# Patient Record
Sex: FEMALE | Race: WHITE | Employment: OTHER
[De-identification: names, ages, dates, MRNs, and addresses within clinical notes are randomized per-mention and may not be internally consistent; named-entity substitution may affect disease eponyms.]

---

## 2017-02-21 DIAGNOSIS — L65.2 ALOPECIA MUCINOSA: Primary | ICD-10-CM

## 2017-02-21 LAB
DEPRECATED CALCIDIOL+CALCIFEROL SERPL-MC: 37 UG/L (ref 20–75)
ERYTHROCYTE [DISTWIDTH] IN BLOOD BY AUTOMATED COUNT: 12.8 % (ref 10–15)
FERRITIN SERPL-MCNC: 24 NG/ML (ref 8–252)
HCT VFR BLD AUTO: 38.3 % (ref 35–47)
HGB BLD-MCNC: 13.1 G/DL (ref 11.7–15.7)
IRON SATN MFR SERPL: 40 % (ref 15–46)
IRON SERPL-MCNC: 139 UG/DL (ref 35–180)
MCH RBC QN AUTO: 31.3 PG (ref 26.5–33)
MCHC RBC AUTO-ENTMCNC: 34.2 G/DL (ref 31.5–36.5)
MCV RBC AUTO: 92 FL (ref 78–100)
PLATELET # BLD AUTO: 199 10E9/L (ref 150–450)
RBC # BLD AUTO: 4.18 10E12/L (ref 3.8–5.2)
T3FREE SERPL-MCNC: 3.1 PG/ML (ref 2.3–4.2)
TIBC SERPL-MCNC: 349 UG/DL (ref 240–430)
TRANSFERRIN SERPL-MCNC: 274 MG/DL (ref 210–360)
TSH SERPL DL<=0.005 MIU/L-ACNC: 4 MU/L (ref 0.4–4)
VIT B12 SERPL-MCNC: 566 PG/ML (ref 193–986)
WBC # BLD AUTO: 5.8 10E9/L (ref 4–11)

## 2017-02-21 PROCEDURE — 85027 COMPLETE CBC AUTOMATED: CPT | Performed by: FAMILY MEDICINE

## 2017-02-21 PROCEDURE — 82728 ASSAY OF FERRITIN: CPT | Performed by: FAMILY MEDICINE

## 2017-02-21 PROCEDURE — 82607 VITAMIN B-12: CPT | Performed by: FAMILY MEDICINE

## 2017-02-21 PROCEDURE — 84466 ASSAY OF TRANSFERRIN: CPT | Performed by: FAMILY MEDICINE

## 2017-02-21 PROCEDURE — 83540 ASSAY OF IRON: CPT | Performed by: FAMILY MEDICINE

## 2017-02-21 PROCEDURE — 36415 COLL VENOUS BLD VENIPUNCTURE: CPT | Performed by: FAMILY MEDICINE

## 2017-02-21 PROCEDURE — 82306 VITAMIN D 25 HYDROXY: CPT | Performed by: FAMILY MEDICINE

## 2017-02-21 PROCEDURE — 84481 FREE ASSAY (FT-3): CPT | Performed by: FAMILY MEDICINE

## 2017-02-21 PROCEDURE — 84443 ASSAY THYROID STIM HORMONE: CPT | Performed by: FAMILY MEDICINE

## 2018-02-18 ENCOUNTER — HEALTH MAINTENANCE LETTER (OUTPATIENT)
Age: 48
End: 2018-02-18

## 2018-10-25 ENCOUNTER — OFFICE VISIT (OUTPATIENT)
Dept: PEDIATRICS | Facility: CLINIC | Age: 48
End: 2018-10-25
Payer: COMMERCIAL

## 2018-10-25 VITALS
SYSTOLIC BLOOD PRESSURE: 104 MMHG | OXYGEN SATURATION: 98 % | TEMPERATURE: 99.3 F | WEIGHT: 132.7 LBS | HEART RATE: 66 BPM | DIASTOLIC BLOOD PRESSURE: 76 MMHG | BODY MASS INDEX: 21.33 KG/M2 | HEIGHT: 66 IN

## 2018-10-25 DIAGNOSIS — I49.3 PVC'S (PREMATURE VENTRICULAR CONTRACTIONS): ICD-10-CM

## 2018-10-25 DIAGNOSIS — I49.9 IRREGULAR HEART BEATS: Primary | ICD-10-CM

## 2018-10-25 PROCEDURE — 93000 ELECTROCARDIOGRAM COMPLETE: CPT | Performed by: INTERNAL MEDICINE

## 2018-10-25 PROCEDURE — 99214 OFFICE O/P EST MOD 30 MIN: CPT | Performed by: INTERNAL MEDICINE

## 2018-10-25 RX ORDER — BIOTIN 10 MG
TABLET ORAL
COMMUNITY

## 2018-10-25 NOTE — MR AVS SNAPSHOT
After Visit Summary   10/25/2018    Jasmyn Muller    MRN: [id number]           Patient Information     Date Of Birth          1970        Visit Information        Provider Department      10/25/2018 10:30 AM Lawson Benítez MD [facility]        Today's Diagnoses     PVC's (premature ventricular contractions)    -  1    Irregular heart beats           Follow-ups after your visit        Follow-up notes from your care team     Return if symptoms worsen or fail to improve.      Your next 10 appointments already scheduled     Nov 05, 2018  8:15 AM CST   PHYSICAL with Gurmeet Madison MD   [facility] ([facility])    [address] [address] [place] [address] [phone]              Future tests that were ordered for you today     Open Future Orders        Priority Expected Expires Ordered    Holter Monitor 24 hour - Adult Routine  12/9/2018 10/25/2018    Echocardiogram Complete Routine  10/25/2019 10/25/2018            Who to contact     If you have questions or need follow up information about today's clinic visit or your schedule please contact [facility] directly at [phone].  Normal or non-critical lab and imaging results will be communicated to you by MyChart, letter or phone within 4 business days after the clinic has received the results. If you do not hear from us within 7 days, please contact the clinic through [facility] or phone. If you have a critical or abnormal lab result, we will notify you by phone as soon as possible.  Submit refill requests through The Scene or call your pharmacy and they will forward the refill request to us. Please allow 3 business days for your refill to be completed.          Additional Information About Your Visit        MyChart Information     The Scene gives you secure access to your electronic health record. If you see a primary care provider, you can also send messages to your  "care team and make appointments. If you have questions, please call your primary care clinic.  If you do not have a primary care provider, please call [phone] and they will assist you.      [employer] is an electronic gateway that provides easy, online access to your medical records. With [employer], you can request a clinic appointment, read your test results, renew a prescription or communicate with your care team.     To access your existing account, please contact your [facility] or call [phone] for assistance.        Care EveryWhere ID     This is your Care EveryWhere ID. This could be used by other organizations to access your [facility] medical records  [id number]        Your Vitals Were     Pulse Temperature Height Last Period Pulse Oximetry BMI (Body Mass Index)    66 99.3  F (37.4  C) (Temporal) 5' 5.75\" (1.67 m) 10/23/2018 98% 21.58 kg/m2       Blood Pressure from Last 3 Encounters:   10/25/18 104/76   12/28/16 131/83   01/15/16 100/65    Weight from Last 3 Encounters:   10/25/18 132 lb 11.2 oz (60.2 kg)   12/28/16 133 lb 4.8 oz (60.5 kg)   01/15/16 123 lb 4.8 oz (55.9 kg)              We Performed the Following     EKG 12-lead complete w/read - Clinics     [facility]        Primary Care Provider Office Phone # Fax #    Gurmeet Madison [phone]       [address] [facility] [address]        Equal Access to Services     [facility] AH: Hadii aad ku hadasho Soomaali, waaxda luqadaha, qaybta kaalmada adeegyada, waxay janel cespedes. So [facility] [phone].    ATENCIÓN: Si habla español, tiene a estrada disposición servicios gratuitos de asistencia lingüística. Llame al [phone].    We comply with applicable federal civil rights laws and [place] laws. We do not discriminate on the basis of race, color, national origin, age, disability, sex, sexual orientation, or gender identity.            Thank you!     Thank you for choosing [facility]  for your care. Our goal is always to provide you with excellent care. Hearing back from our patients is one way we can continue to improve our services. Please take a few minutes to complete the written survey that you may receive in the mail after your visit with us. Thank you!             Your Updated Medication List - Protect others around you: Learn how to safely use, store and throw away your medicines at www.disposemymeds.org.          This list is accurate as of 10/25/18 11:29 AM.  Always use your most recent med list.                   Brand Name Dispense Instructions for use Diagnosis    * HAIR SKIN AND NAILS FORMULA PO           * MULTIVITAMIN ADULT Chew           MULTIVITAMINS FC Tabs      1 TABLET DAILY        naproxen 500 MG tablet    NAPROSYN    30 tablet    Take 1 tablet (500 mg) by mouth 2 times daily as needed for moderate pain    Right shoulder pain       * Notice:  This list has 2 medication(s) that are the same as other medications prescribed for you. Read the directions carefully, and ask your doctor or other care provider to review them with you.

## 2018-10-25 NOTE — PROGRESS NOTES
SUBJECTIVE:   Jasmyn Muller is a 48 year old female who presents to clinic today for the following health issues:      Irregular heart rate:    Patient states she has noticed it off/on for 3 years.  Notices it more when she is lying in bed at night.  Sounds and feels like a drum.  Like an extra big heart beat.  Not a racing heart beat.  It can feel like an extra heart beat.    No chest pain, no numbness, no shortness of breath    HPI  48-year-old young lady comes in stating that she has been noticing some extra heartbeats particularly at night and some of the extra heartbeats forceful.  She almost feels a skipping heartbeat.  She has had the symptoms off and on for years but lately she has been more aware of it.  Denies chest pain, shortness of breath, dizziness or lightheadedness.  No leg edema.  She drinks maybe 1 cup of coffee a day and does not use caffeine excessively.  She is a non-smoker and does not use alcohol.  There is no family history of cardiovascular disease.    Problem list and histories reviewed & adjusted, as indicated.  Additional history: as documented    Patient Active Problem List   Diagnosis     Mixed conductive and sensorineural hearing loss     Other retinal disorders(362.89)     CARDIOVASCULAR SCREENING; LDL GOAL LESS THAN 160     Past Surgical History:   Procedure Laterality Date     CATARACT IOL, RT/LT  04/2006    right eye     HC CAUTERY OF CERVIX, CRYOCAUTERY  1997    CIN1/HPV     SOFT TISSUE SURGERY  11/2007       Social History   Substance Use Topics     Smoking status: Never Smoker     Smokeless tobacco: Never Used     Alcohol use No     Family History   Problem Relation Age of Onset     Unknown/Adopted Maternal Grandfather      cirrohsis of liver     Prostate Cancer Maternal Grandfather      Neurologic Disorder Paternal Grandfather      parkinsons     Eye Disorder Brother      vision and hearing impairment, He has Usher synd too     Neurologic Disorder Brother      hearing  "and vision impairment. - in family     Congenital Anomalies Sister       at 9months of multiple birth defects, Trisomy 21     HEART DISEASE Sister      heart defect     Cancer Mother      lymphoma- chemo     Other Cancer Mother      Cerebrovascular Disease Paternal Grandmother      cerebral aneurysm     Osteoporosis Paternal Grandmother      Breast Cancer No family hx of      Cancer - colorectal No family hx of          Current Outpatient Prescriptions   Medication Sig Dispense Refill     Multiple Vitamins-Minerals (HAIR SKIN AND NAILS FORMULA PO)        Multiple Vitamins-Minerals (MULTIVITAMIN ADULT) CHEW        MULTIVITAMINS FC OR TABS 1 TABLET DAILY       naproxen (NAPROSYN) 500 MG tablet Take 1 tablet (500 mg) by mouth 2 times daily as needed for moderate pain (Patient not taking: Reported on 10/25/2018) 30 tablet 1     Allergies   Allergen Reactions     Nkda [No Known Drug Allergies]        Reviewed and updated as needed this visit by clinical staff  Tobacco  Allergies  Meds  Med Hx  Surg Hx  Fam Hx  Soc Hx      Reviewed and updated as needed this visit by Provider  Med Hx         ROS:  Constitutional, HEENT, cardiovascular, pulmonary, GI, , musculoskeletal, neuro, skin, endocrine and psych systems are negative, except as otherwise noted.    OBJECTIVE:     /76 (BP Location: Right arm, Patient Position: Sitting, Cuff Size: Adult Regular)  Pulse 66  Temp 99.3  F (37.4  C) (Temporal)  Ht 5' 5.75\" (1.67 m)  Wt 132 lb 11.2 oz (60.2 kg)  LMP 10/23/2018  SpO2 98%  BMI 21.58 kg/m2  Body mass index is 21.58 kg/(m^2).  GENERAL: healthy, alert and no distress  NECK: no adenopathy, no asymmetry, masses, or scars and thyroid normal to palpation  RESP: lungs clear to auscultation - no rales, rhonchi or wheezes  CV: regular rate and rhythm, normal S1 S2, no S3 or S4, no murmur, click or rub, no peripheral edema and peripheral pulses strong  ABDOMEN: soft, nontender, no hepatosplenomegaly, no masses " and bowel sounds normal  MS: no gross musculoskeletal defects noted, no edema    Diagnostic Test Results:  EKG performed today shows sinus rhythm infection slightly bradycardic with a single PVC noted on EKG tracing.    ASSESSMENT/PLAN:     1.  PVCs.  She has no cardiovascular risk factors.  I informed her that this is a benign condition.  Best to ignore them unless she cannot then we could consider a trial of beta-blocker therapy.  To further investigate I recommend TSH, 24 Holter to quantify the number of PVCs she is getting and whether or not there is uniform or not.  Also an echocardiogram.  After reviewing this studies all get back to her with further recommendation.  I informed her that this is a benign condition and not of any concern.    Lawson Benítez MD  [facility]

## 2018-11-05 ENCOUNTER — OFFICE VISIT (OUTPATIENT)
Dept: OBGYN | Facility: CLINIC | Age: 48
End: 2018-11-05
Payer: COMMERCIAL

## 2018-11-05 VITALS
HEART RATE: 65 BPM | BODY MASS INDEX: 21.05 KG/M2 | HEIGHT: 66 IN | SYSTOLIC BLOOD PRESSURE: 118 MMHG | TEMPERATURE: 98.4 F | WEIGHT: 131 LBS | DIASTOLIC BLOOD PRESSURE: 63 MMHG

## 2018-11-05 DIAGNOSIS — Z01.419 ENCOUNTER FOR GYNECOLOGICAL EXAMINATION WITHOUT ABNORMAL FINDING: Primary | ICD-10-CM

## 2018-11-05 LAB
CHOLEST SERPL-MCNC: 158 MG/DL
GLUCOSE SERPL-MCNC: 98 MG/DL (ref 70–99)
HBA1C MFR BLD: 5.4 % (ref 0–5.6)
HDLC SERPL-MCNC: 84 MG/DL
LDLC SERPL CALC-MCNC: 62 MG/DL
NONHDLC SERPL-MCNC: 74 MG/DL
TRIGL SERPL-MCNC: 61 MG/DL
TSH SERPL DL<=0.005 MIU/L-ACNC: 2.12 MU/L (ref 0.4–4)

## 2018-11-05 PROCEDURE — 80061 LIPID PANEL: CPT | Performed by: OBSTETRICS & GYNECOLOGY

## 2018-11-05 PROCEDURE — 99386 PREV VISIT NEW AGE 40-64: CPT | Performed by: OBSTETRICS & GYNECOLOGY

## 2018-11-05 PROCEDURE — 82947 ASSAY GLUCOSE BLOOD QUANT: CPT | Performed by: OBSTETRICS & GYNECOLOGY

## 2018-11-05 PROCEDURE — 83036 HEMOGLOBIN GLYCOSYLATED A1C: CPT | Performed by: OBSTETRICS & GYNECOLOGY

## 2018-11-05 PROCEDURE — G0145 SCR C/V CYTO,THINLAYER,RESCR: HCPCS | Performed by: OBSTETRICS & GYNECOLOGY

## 2018-11-05 PROCEDURE — 36415 COLL VENOUS BLD VENIPUNCTURE: CPT | Performed by: OBSTETRICS & GYNECOLOGY

## 2018-11-05 PROCEDURE — 84443 ASSAY THYROID STIM HORMONE: CPT | Performed by: OBSTETRICS & GYNECOLOGY

## 2018-11-05 PROCEDURE — 87624 HPV HI-RISK TYP POOLED RSLT: CPT | Performed by: OBSTETRICS & GYNECOLOGY

## 2018-11-05 NOTE — PATIENT INSTRUCTIONS
If you have any questions regarding your visit, Please contact your care team.     [employer] Services: [phone]    Women s Health CLINIC HOURS TELEPHONE NUMBER       LA Eckert-      Jeanette Santana-Medical Assistant       Monday-[place]  8:00a.m-4:45 p.m  Tuesday-[place]  9:00a.m-11:45 a.m  Wednesday-[place] 8:00a.m-4:45 p.m.  Thursday-[place]  8:00a.m-4:45 p.m.  Friday-[place]  8:00a.m-4:45 p.m. [facility]  [address] [place] [address]  [phone] ask [facility]  [phone] Fax  Imaging [phone]    [facility] Labor [facility] Delivery  [address] [place] [address] [place] [address]  [phone]    [facility]  [address] [place] [address]  [phone] [facility]  [phone] Fax  Imaging [phone]     Urgent Care locations:    [place]        [place] Monday-Friday  5 pm - 9 pm  Saturday and Sunday   9 am - 5 pm    Monday-Friday   11 am - 9 pm  Saturday and Sunday   9 am - 5 pm   [phone]       If you need a medication refill, please contact your pharmacy. Please allow 3 business days for your refill to be completed.  As always, Thank you for trusting us with your healthcare needs!

## 2018-11-05 NOTE — PROGRESS NOTES
"Jasmyn Muller is a 48 year old year old who presents for annual exam. Patient's last menstrual period was 10/23/2018.    HPI: Doing well.  Menses q 28-30 days x 2-3 days but some premenstrual spotting as well.  Significant interval changes: none.  Current significant symptoms: irregular heartbeat again under evaluation, cataract surgery may be done soon.  Other problems or concerns: none.   Contraceptive method is condoms or withdrawal.     Past medical, obstetrical, surgical, family and social history reviewed and as noted or updated in chart.     Allergies, meds and supplements are as noted or updated in chart.     ROS:     Systems reviewed include constitutional; breast;                 cardiac; respiratory; gastrointestinal; genitourinary;                                musculoskeletal; integumentary; psychological;                                hematologic/lymphatic and endocrine.                  These systems were negative for significant symptoms except                 for the following additional: none ; see HPI.                                Exam:  VS as noted./63 (BP Location: Right arm, Patient Position: Chair, Cuff Size: Adult Regular)  Pulse 65  Temp 98.4  F (36.9  C) (Oral)  Ht 5' 5.75\" (1.67 m)  Wt 131 lb (59.4 kg)  LMP 10/23/2018  Breastfeeding? No  BMI 21.31 kg/m2                    Neck, nodes, breasts, abd and pelvis were                             normal or negative except for, or in particular noting, the following                pertinent findings: irregular pulse, cervix s/p treatment, mild irregular uterine contour suggesting small stable fibroid.      Assessment: Gyn exam. Problem List updated.    Plan and Recommendations: Symptoms, problems and concerns reviewed. Health habits and vaccinations reviewed. Calcium/Vitamin D and multi-vitamin supplements instructions given. Breast/skin self-exam awareness. Screening tests including limitations discussed. Follow-up visits " discussed. See orders. Mammogram advised. Discussed future colonoscopy screening. RTC 1 year.  Continue other medical care.     Jasmyn was seen today for physical.    Diagnoses and all orders for this visit:    Encounter for gynecological examination without abnormal finding  -     MA SCREENING DIGITAL BILAT; Future  -     TSH with free T4 reflex  -     Lipid Profile  -     Hemoglobin A1c  -     Glucose  -     Pap imaged thin layer screen with HPV - recommended age 30 - 65 years (select HPV order below)  -     HPV High Risk Types DNA Cervical        Gurmeet Madison MD

## 2018-11-05 NOTE — MR AVS SNAPSHOT
After Visit Summary   11/5/2018    Jasmyn Muller    MRN: [id number]           Patient Information     Date Of Birth          1970        Visit Information        Provider Department      11/5/2018 8:15 AM Gurmeet Madison MD [facility]        Care Instructions                                                        If you have any questions regarding your visit, Please contact your care team.     [facility]: [phone]    [facility] CLINIC HOURS TELEPHONE NUMBER       Gurmeet Madison M.D.      Christel-      Jeanette Santana-Medical Assistant       Monday-[place]  8:00a.m-4:45 p.m  Tuesday-[place]  9:00a.m-11:45 a.m  Wednesday-[place] 8:00a.m-4:45 p.m.  Thursday-[place]  8:00a.m-4:45 p.m.  Friday-[place]  8:00a.m-4:45 p.m. [facility]  [address] [address] [place] [address] [address].  [place], [place] [address] [phone] ask [facility]  [phone] Fax  Imaging [phone]    [facility] Labor and Delivery  [address] [place] [address] Dr.  [place], [place] [address] [phone]    [facility]  [address] [place] [place] [address]  [phone] ask [facility]  [phone] Fax  Imaging [phone]     Urgent Care locations:    [facility] Monday-Friday  5 pm - 9 pm  Saturday and Sunday   9 am - 5 pm    Monday-Friday   11 am - 9 pm  Saturday and Sunday   9 am - 5 pm   [phone]       If you need a medication refill, please contact your pharmacy. Please allow 3 business days for your refill to be completed.  As always, Thank you for trusting us with your healthcare needs!            Follow-ups after your visit        Your next 10 appointments already scheduled     Nov 05, 2018  8:15 AM CST   PHYSICAL with Gurmeet Madison MD   [facility] ([facility])    [address] [address] [place] [address]    [phone]            Nov 07, 2018 10:30 AM CST   Ech Complete with MGECHR1, MG ECHO TECH   [facility]    [address] [place] [address] [phone]           1.  Please bring or wear a comfortable two-piece outfit. 2.  You may eat, drink and take your normal medicines. 3.  For any questions that cannot be answered, please contact the ordering physician 4.  Please do not wear perfumes or scented lotions on the day of your exam.            Nov 07, 2018 11:30 AM CST   Holter Monitor with MG DEVICE RM, MG CV TECH   [facility]    [address] [place] [address] [phone]              Who to contact     If you have questions or need follow up information about today's clinic visit or your schedule please contact [facility] directly at [phone].  Normal or non-critical lab and imaging results will be communicated to you by [employer], letter or phone within 4 business days after the clinic has received the results. If you do not hear from us within 7 days, please contact the clinic through [facility] or phone. If you have a critical or abnormal lab result, we will notify you by phone as soon as possible.  Submit refill requests through [employer] or call your pharmacy and they will forward the refill request to us. Please allow 3 business days for your refill to be completed.          Additional Information About Your Visit        [employer] Information     [employer] gives you secure access to your electronic health record. If you see a primary care provider, you can also send messages to your care team and make appointments. If you have questions, please call your primary care clinic.  If you do not have a primary care provider, please call [phone] and they will assist you.        Care EveryWhere ID     This is your Care EveryWhere ID. This could be used by other  "organizations to access your [facility] medical records  [id number]        Your Vitals Were     Pulse Temperature Height Last Period Breastfeeding? BMI (Body Mass Index)    65 98.4  F (36.9  C) (Oral) 5' 5.75\" (1.67 m) 10/23/2018 No 21.31 kg/m2       Blood Pressure from Last 3 Encounters:   11/05/18 118/63   10/25/18 104/76   12/28/16 131/83    Weight from Last 3 Encounters:   11/05/18 131 lb (59.4 kg)   10/25/18 132 lb 11.2 oz (60.2 kg)   12/28/16 133 lb 4.8 oz (60.5 kg)              Today, you had the following     No orders found for display         Today's Medication Changes          These changes are accurate as of 11/5/18  8:11 AM.  If you have any questions, ask your nurse or doctor.               Stop taking these medicines if you haven't already. Please contact your care team if you have questions.     MULTIVITAMINS FC Tabs   Stopped by:  Gurmeet Madison MD                    Primary Care Provider Office Phone # Fax #    Gurmeet Madison [phone]       [address] [facility] [address]        Equal Access to Services     DENZEL NGUYỄN AH: Hadii venus urias hadasho Soomaali, waaxda luqadaha, qaybta kaalmada adeegyada, kandy cespedes. So [facility] [phone].    ATENCIÓN: Si habla español, tiene a estrada disposición servicios gratuitos de asistencia lingüística. Llame al [phone].    We comply with applicable federal civil rights laws and [place] laws. We do not discriminate on the basis of race, color, national origin, age, disability, sex, sexual orientation, or gender identity.            Thank you!     Thank you for choosing [facility]  for your care. Our goal is always to provide you with excellent care. Hearing back from our patients is one way we can continue to improve our services. Please take a few minutes to complete the written survey that you may receive in the mail after your visit with us. Thank you!             Your Updated " Medication List - Protect others around you: Learn how to safely use, store and throw away your medicines at www.disposemymeds.org.          This list is accurate as of 11/5/18  8:11 AM.  Always use your most recent med list.                   Brand Name Dispense Instructions for use Diagnosis    * HAIR SKIN AND NAILS FORMULA PO           * MULTIVITAMIN ADULT Chew           naproxen 500 MG tablet    NAPROSYN    30 tablet    Take 1 tablet (500 mg) by mouth 2 times daily as needed for moderate pain    Right shoulder pain       * Notice:  This list has 2 medication(s) that are the same as other medications prescribed for you. Read the directions carefully, and ask your doctor or other care provider to review them with you.

## 2018-11-07 LAB
COPATH REPORT: NORMAL
PAP: NORMAL

## 2018-11-08 LAB
FINAL DIAGNOSIS: NORMAL
HPV HR 12 DNA CVX QL NAA+PROBE: NEGATIVE
HPV16 DNA SPEC QL NAA+PROBE: NEGATIVE
HPV18 DNA SPEC QL NAA+PROBE: NEGATIVE
SPECIMEN DESCRIPTION: NORMAL
SPECIMEN SOURCE CVX/VAG CYTO: NORMAL

## 2018-11-14 ENCOUNTER — ALLIED HEALTH/NURSE VISIT (OUTPATIENT)
Dept: CARDIOLOGY | Facility: CLINIC | Age: 48
End: 2018-11-14
Attending: INTERNAL MEDICINE
Payer: COMMERCIAL

## 2018-11-14 DIAGNOSIS — I49.3 PVC'S (PREMATURE VENTRICULAR CONTRACTIONS): ICD-10-CM

## 2018-11-14 DIAGNOSIS — I49.9 IRREGULAR HEART BEATS: ICD-10-CM

## 2018-11-14 PROCEDURE — 93306 TTE W/DOPPLER COMPLETE: CPT

## 2018-11-14 PROCEDURE — 93225 XTRNL ECG REC<48 HRS REC: CPT

## 2018-11-14 PROCEDURE — 93227 XTRNL ECG REC<48 HR R&I: CPT

## 2018-11-14 NOTE — MR AVS SNAPSHOT
MRN:[id number]                      After Visit Summary   11/14/2018    Jasmyn Muller    MRN: [id number]           Visit Information        Provider Department      11/14/2018 11:30 AM MG CV TECH; MG DEVICE RM [facility]        [employer] Information     [employer] gives you secure access to your electronic health record. If you see a primary care provider, you can also send messages to your care team and make appointments. If you have questions, please call your primary care clinic.  If you do not have a primary care provider, please call [phone] and they will assist you.      [employer] is an electronic gateway that provides easy, online access to your medical records. With [employer], you can request a clinic appointment, read your test results, renew a prescription or communicate with your care team.     To access your existing account, please contact your [facility] or call [phone] for assistance.        Care EveryWhere ID     This is your Care EveryWhere ID. This could be used by other organizations to access your [place] medical records  [id number]        Equal Access to Services     DENZEL NGUYỄN : Hadii venus urias hadasho Soethan, waaxda luqadaha, qaybta kaalmada adedavid, kandy cespedes. So [facility] [phone].    ATENCIÓN: Si habla español, tiene a estrada disposición servicios gratuitos de asistencia lingüística. Llame al [phone].    We comply with applicable federal civil rights laws and [place] laws. We do not discriminate on the basis of race, color, national origin, age, disability, sex, sexual orientation, or gender identity.

## 2018-11-19 ENCOUNTER — TELEPHONE (OUTPATIENT)
Dept: PEDIATRICS | Facility: CLINIC | Age: 48
End: 2018-11-19

## 2018-11-19 LAB — INTERPRETATION MONITOR -MUSE: NORMAL

## 2018-11-19 NOTE — TELEPHONE ENCOUNTER
Discussed the ultrasound pelvics with the patient.  She has hemorrhagic cyst.  She is already seen by the OB/GYN provider.  She will continue her care with them.

## 2018-11-26 ENCOUNTER — TELEPHONE (OUTPATIENT)
Dept: PEDIATRICS | Facility: CLINIC | Age: 48
End: 2018-11-26

## 2018-11-26 NOTE — TELEPHONE ENCOUNTER
Holter Monitor 24 hour - Adult   Status:  Final result   Visible to patient:  No (Not Released) Dx:  PVC's (premature ventricular contract... Order: [id number]       Notes Recorded by Lawson Benítez MD on 11/24/2018 at 10:14 PM  The heart monitor showed frequent premature atrial heart beats. These are benign in nature and do not require treatment unless they are symptomatic, that is if you constantly feel them. We can than consider treatment you with medication called Beta blocker.        12d ago       Interpretation Cardiac Monitor Click View Image link to view waveform and result     Resulting Agency [facility]          Specimen Collected: 11/14/18 11:05 AM Last Resulted: 11/19/18                       Linked Documents        View Image               Preeti Franco RN

## 2018-11-27 NOTE — TELEPHONE ENCOUNTER
Called patient and gave message per Dr. Benítez.  Patient verbalized understanding and agreement to plan.       Patient has an additional questions.      Will this condition affect her risk for sedation?   She has a cataract surgery in a few months.     Also will it affect exercise?    Nicolasa Landrum RN, [facility]

## 2018-11-27 NOTE — TELEPHONE ENCOUNTER
Called patient and gave message per Dr. Benítez.  Patient verbalized understanding and agreement to plan. Nicolasa Landrum RN, [facility]

## 2018-12-21 ENCOUNTER — OFFICE VISIT (OUTPATIENT)
Dept: PEDIATRICS | Facility: CLINIC | Age: 48
End: 2018-12-21
Payer: COMMERCIAL

## 2018-12-21 VITALS
HEART RATE: 68 BPM | WEIGHT: 134.3 LBS | SYSTOLIC BLOOD PRESSURE: 101 MMHG | BODY MASS INDEX: 21.58 KG/M2 | HEIGHT: 66 IN | TEMPERATURE: 99.1 F | OXYGEN SATURATION: 96 % | DIASTOLIC BLOOD PRESSURE: 66 MMHG

## 2018-12-21 DIAGNOSIS — H26.492 OTHER SECONDARY CATARACT OF LEFT EYE: ICD-10-CM

## 2018-12-21 DIAGNOSIS — I49.3 PVC'S (PREMATURE VENTRICULAR CONTRACTIONS): ICD-10-CM

## 2018-12-21 DIAGNOSIS — H35.52 RETINITIS PIGMENTOSA: ICD-10-CM

## 2018-12-21 DIAGNOSIS — Z01.818 PREOP GENERAL PHYSICAL EXAM: Primary | ICD-10-CM

## 2018-12-21 PROBLEM — H26.40 SECONDARY CATARACT OF LEFT EYE: Status: ACTIVE | Noted: 2018-12-21

## 2018-12-21 PROCEDURE — 99214 OFFICE O/P EST MOD 30 MIN: CPT | Performed by: INTERNAL MEDICINE

## 2018-12-21 ASSESSMENT — MIFFLIN-ST. JEOR: SCORE: 1251.96

## 2018-12-21 NOTE — PROGRESS NOTES
[address] [address]  [phone]  Dept: [phone]    PRE-OP EVALUATION:  Today's date: 2018    Jasmyn Muller (: 1970) presents for pre-operative evaluation assessment as requested by Dr. Gian Garcia/[facility].  She requires evaluation and anesthesia risk assessment prior to undergoing surgery/procedure for treatment of Left eye laser and Right eye cataract .    Proposed Surgery/ Procedure: Left eye laser treatment/Right eye cataract surgery  Date of Surgery/ Procedure: 18 left eye laser/19 Right eye cataract  Time of Surgery/ Procedure: 10:00 am left eye laser and 3:30 pm right eye cataract  Hospital/Surgical Facility: [facility]  Fax number for surgical facility: [phone]  Primary Physician: Gurmeet Madison  Type of Anesthesia Anticipated: Local    Patient has a Health Care Directive or Living Will:  Yes, no copy on file.  Patient will bring in copy for chart.    1. NO - Do you have a history of heart attack, stroke, stent, bypass or surgery on an artery in the head, neck, heart or legs?  2. NO - Do you ever have any pain or discomfort in your chest?  3. NO - Do you have a history of  Heart Failure?  4. NO - Are you troubled by shortness of breath when: walking on the level, up a slight hill or at night?  5. NO - Do you currently have a cold, bronchitis or other respiratory infection?  6. NO - Do you have a cough, shortness of breath or wheezing?  7. NO - Do you sometimes get pains in the calves of your legs when you walk?  9. NO - Do you or does anyone in your family have a serious bleeding problem such as prolonged bleeding following surgeries or cuts?  10. NO - Have you ever had problems with anemia or been told to take iron pills?  11. NO - Have you had any abnormal blood loss such as black, tarry or bloody stools, or abnormal vaginal bleeding?  12. NO - Have you ever had a blood transfusion?  13. NO -  Have you or any of your relatives ever had problems with anesthesia?  14. NO - Do you have sleep apnea, excessive snoring or daytime drowsiness?  15. NO - Do you have any prosthetic heart valves?  16. NO - Do you have prosthetic joints?  17. NO - Is there any chance that you may be pregnant?      HPI:     HPI related to upcoming procedure:   48-year-old lady comes in for preop medical clearance.  She has had previous left eye cataract surgery and is now to undergo laser treatment.  That is going to be followed by right eye cataract surgery.  She gives history of retinitis pigmentosa.    She recently was thoroughly evaluated for premature heartbeat.  She had an echocardiogram in cardiac monitoring which revealed benign PVCs.  She offers no complaints.  No chest pain, dizziness, lightheadedness or syncope.  No leg edema.  She is other wise in good health.        MEDICAL HISTORY:     Patient Active Problem List    Diagnosis Date Noted     PVC's (premature ventricular contractions) 12/21/2018     Priority: Medium     Secondary cataract of left eye 12/21/2018     Priority: Medium     Retinitis pigmentosa 12/21/2018     Priority: Medium     CARDIOVASCULAR SCREENING; LDL GOAL LESS THAN 160 10/31/2010     Priority: Medium     Mixed conductive and sensorineural hearing loss 07/11/2003     Priority: Medium              Other retinal disorders(362.89) 07/11/2003     Priority: Medium      Past Medical History:   Diagnosis Date     Bite, dog 11/07    dog attack     Dysplasia of cervix (uteri) 1997    LITA 1/HPV     Irregular heartbeat 2009     Nerve damage     due to dog attack     Scanty or infrequent menstruation     chronic, benign     Usher syndrome     auto recessive; progressive hearing and visual loss     Past Surgical History:   Procedure Laterality Date     CATARACT IOL, RT/LT  04/2006    right eye     HC CAUTERY OF CERVIX, CRYOCAUTERY  1997    CIN1/HPV     SOFT TISSUE SURGERY  11/2007     Current Outpatient Medications  "  Medication Sig Dispense Refill     Multiple Vitamins-Minerals (MULTIVITAMIN ADULT) CHEW        Multiple Vitamins-Minerals (HAIR SKIN AND NAILS FORMULA PO)        naproxen (NAPROSYN) 500 MG tablet Take 1 tablet (500 mg) by mouth 2 times daily as needed for moderate pain (Patient not taking: Reported on 10/25/2018) 30 tablet 1     OTC products: None, except as noted above    Allergies   Allergen Reactions     Nkda [No Known Drug Allergies]       Latex Allergy: NO    Social History     Tobacco Use     Smoking status: Never Smoker     Smokeless tobacco: Never Used   Substance Use Topics     Alcohol use: No     History   Drug Use No       REVIEW OF SYSTEMS:   Constitutional, neuro, ENT, endocrine, pulmonary, cardiac, gastrointestinal, genitourinary, musculoskeletal, integument and psychiatric systems are negative, except as otherwise noted.    EXAM:   /66 (BP Location: Right arm, Patient Position: Sitting, Cuff Size: Adult Regular)   Pulse 68   Temp 99.1  F (37.3  C) (Temporal)   Ht 1.67 m (5' 5.75\")   Wt 60.9 kg (134 lb 4.8 oz)   SpO2 96%   BMI 21.84 kg/m      GENERAL APPEARANCE: healthy, alert and no distress     EYES: EOMI, PERRL     HENT: ear canals and TM's normal and nose and mouth without ulcers or lesions     NECK: no adenopathy, no asymmetry, masses, or scars and thyroid normal to palpation     RESP: lungs clear to auscultation - no rales, rhonchi or wheezes     CV: regular rates and rhythm, normal S1 S2, no S3 or S4 and no murmur, click or rub     ABDOMEN:  soft, nontender, no HSM or masses and bowel sounds normal     MS: extremities normal- no gross deformities noted, no evidence of inflammation in joints, FROM in all extremities.     SKIN: no suspicious lesions or rashes     NEURO: Normal strength and tone, sensory exam grossly normal, mentation intact and speech normal     PSYCH: mentation appears normal. and affect normal/bright     LYMPHATICS: No cervical adenopathy    DIAGNOSTICS:   No labs " or EKG required for low risk surgery (cataract, skin procedure, breast biopsy, etc)    Recent Labs   Lab Test 11/05/18  0847 02/21/17  0833 08/17/15  0920 11/22/11  0939   HGB  --  13.1  --  12.3   PLT  --  199  --  239   NA  --   --   --  140   POTASSIUM  --   --   --  3.9   CR  --   --   --  0.74   A1C 5.4  --  5.8  --         IMPRESSION:   Diagnosis/reason for consult:     1.  Right eye cataract.  Patient to undergo above-mentioned surgeries.  2.  Benign PVCs.    Patient is medically optimized to undergo the planned surgical procedure.      The proposed surgical procedure is considered LOW risk.    REVISED CARDIAC RISK INDEX  The patient has the following serious cardiovascular risks for perioperative complications such as (MI, PE, VFib and 3  AV Block):  No serious cardiac risks  INTERPRETATION: 0 risks: Class I (very low risk - 0.4% complication rate)    The patient has the following additional risks for perioperative complications:  No identified additional risks      ICD-10-CM    1. Preop general physical exam Z01.818    2. PVC's (premature ventricular contractions) I49.3    3. Other secondary cataract of left eye H26.492    4. Retinitis pigmentosa H35.52        RECOMMENDATIONS:     --Consult hospital rounder / IM to assist post-op medical management    --Patient is to take all scheduled medications on the day of surgery EXCEPT for modifications listed below.    APPROVAL GIVEN to proceed with proposed procedure, without further diagnostic evaluation       Signed Electronically by: Lawson Benítez MD    Copy of this evaluation report is provided to requesting physician.    [facility] Preop Guidelines    Revised Cardiac Risk Index

## 2018-12-27 ENCOUNTER — TELEPHONE (OUTPATIENT)
Dept: PEDIATRICS | Facility: CLINIC | Age: 48
End: 2018-12-27

## 2018-12-27 NOTE — TELEPHONE ENCOUNTER
[facility] Call Center    Phone Message    May a detailed message be left on voicemail: yes    Reason for Call: Other: Patient had a preop on 12/21/2018. She states the surgeon is requesting a written letter that clears her for surgery. She would like the letter faxed to [facility] at [phone]. Patient has surgery scheduled for 1/3/19.     Action Taken: Message routed to:  Primary Care p [phone]

## 2018-12-27 NOTE — TELEPHONE ENCOUNTER
Faxed preop notes from 12/21/18 visit to number provided and number in preop notes. Called patient to inform completed.  Adal GOMEZ CMA

## 2020-02-16 ENCOUNTER — HEALTH MAINTENANCE LETTER (OUTPATIENT)
Age: 50
End: 2020-02-16

## 2020-11-22 ENCOUNTER — HEALTH MAINTENANCE LETTER (OUTPATIENT)
Age: 50
End: 2020-11-22

## 2021-04-10 ENCOUNTER — HEALTH MAINTENANCE LETTER (OUTPATIENT)
Age: 51
End: 2021-04-10

## 2021-09-19 ENCOUNTER — HEALTH MAINTENANCE LETTER (OUTPATIENT)
Age: 51
End: 2021-09-19

## 2022-01-09 ENCOUNTER — HEALTH MAINTENANCE LETTER (OUTPATIENT)
Age: 52
End: 2022-01-09

## 2022-04-22 ENCOUNTER — VIRTUAL VISIT (OUTPATIENT)
Dept: FAMILY MEDICINE | Facility: CLINIC | Age: 52
End: 2022-04-22
Payer: COMMERCIAL

## 2022-04-22 DIAGNOSIS — L08.9 LOCAL INFECTION OF SKIN AND SUBCUTANEOUS TISSUE: Primary | ICD-10-CM

## 2022-04-22 PROCEDURE — 99203 OFFICE O/P NEW LOW 30 MIN: CPT | Mod: 95 | Performed by: NURSE PRACTITIONER

## 2022-04-22 RX ORDER — CEPHALEXIN 500 MG/1
500 CAPSULE ORAL 2 TIMES DAILY
Qty: 14 CAPSULE | Refills: 0 | Status: SHIPPED | OUTPATIENT
Start: 2022-04-22 | End: 2022-04-29

## 2022-04-22 NOTE — PROGRESS NOTES
Clifford is a 52 year old who is being evaluated via a billable video visit.      How would you like to obtain your AVS? MyChart  If the video visit is dropped, the invitation should be resent by: Text to cell phone: [phone]  Will anyone else be joining your video visit? No    Video Start Time: 1:27 PM    Assessment & Plan     Jasmyn was seen today for derm problem.    Diagnoses and all orders for this visit:    Local infection of skin and subcutaneous tissue  Start with warm compress application 4-5 times daily for 5 minutes and continue to monitor for the next 24-48 hours.  With no improvement or worsening may start antibiotic regimen.   Recommended follow-up in clinic as needed.    -     cephALEXin (KEFLEX) 500 MG capsule; Take 1 capsule (500 mg) by mouth 2 times daily for 7 days      Return in about 1 week (around 4/29/2022) for No improvement or sooner with worsening symptoms.    Leila Garg, RONIT [facility]   Clifford is a 52 year old who presents for the following health issues:      HPI       Skin Lesion  Onset/Duration: On right side of face by ear, started out as a little zit a few weeks, then noticed another one next to it.  Looked like it was going away but then it grew in size, red, no drainage, doesn't look like there's any pus.  Increase in size over the past 24-48 hours.  No drainage or significant pain, +itching intermittently.      Description  Location: right side of face by ear   Color:  red  Border description: raised, red- cushioning, not hard   Character: round  Itching: no  Bleeding:  no  Intensity:  mild  Progression of Symptoms:  Grown in size  Accompanying signs and symptoms:   Bleeding: no  Scaling: no  Excessive sun exposure/tanning: no  Sunscreen used: no  History:           Any previous history of skin cancer: no  Any family history of melanoma: no  Previous episodes of similar lesion: has had cystic acne years ago  Precipitating or  alleviating factors: nothing  Therapies tried and outcome: Vitamin E oil, Cortizone with aloe, acne cream- topical- no improvement       Review of Systems   Constitutional, HEENT, cardiovascular, pulmonary, gi and gu systems are negative, except as otherwise noted.      Objective           Vitals:  No vitals were obtained today due to virtual visit.    Physical Exam   GENERAL: Healthy, alert and no distress  EYES: Eyes grossly normal to inspection.    RESP: No audible wheeze, cough, or visible cyanosis.  No visible retractions or increased work of breathing.    SKIN: right lateral cheek with erythematous cyst-like lesion, no visible drainage  PSYCH: Mentation appears normal, affect normal/bright, judgement and insight intact, normal speech and appearance well-groomed.            Video-Visit Details    Type of service:  Video Visit    Video End Time:1:37 PM    Originating Location (pt. Location): Home    Distant Location (provider location):  [facility]     Platform used for Video Visit: Enviance

## 2022-05-01 ENCOUNTER — HEALTH MAINTENANCE LETTER (OUTPATIENT)
Age: 52
End: 2022-05-01

## 2022-06-28 ENCOUNTER — OFFICE VISIT (OUTPATIENT)
Dept: OBGYN | Facility: CLINIC | Age: 52
End: 2022-06-28
Payer: COMMERCIAL

## 2022-06-28 VITALS
OXYGEN SATURATION: 96 % | DIASTOLIC BLOOD PRESSURE: 68 MMHG | HEART RATE: 75 BPM | WEIGHT: 127.9 LBS | BODY MASS INDEX: 20.8 KG/M2 | SYSTOLIC BLOOD PRESSURE: 115 MMHG

## 2022-06-28 DIAGNOSIS — Z01.411 ENCOUNTER FOR GYNECOLOGICAL EXAMINATION WITH ABNORMAL FINDING: Primary | ICD-10-CM

## 2022-06-28 PROCEDURE — 87624 HPV HI-RISK TYP POOLED RSLT: CPT | Performed by: OBSTETRICS & GYNECOLOGY

## 2022-06-28 PROCEDURE — G0145 SCR C/V CYTO,THINLAYER,RESCR: HCPCS | Performed by: OBSTETRICS & GYNECOLOGY

## 2022-06-28 PROCEDURE — 99386 PREV VISIT NEW AGE 40-64: CPT | Performed by: OBSTETRICS & GYNECOLOGY

## 2022-06-28 NOTE — PATIENT INSTRUCTIONS
To Schedule an Appointment 24/7  Call: [phone]    If you have any questions regarding your visit, Please contact your care team.  [facility]: [phone]  [facility] HOURS TELEPHONE NUMBER   Cephas Agbeh, M.D.      Rima Richmond-Surgery Scheduler  Nasima-Surgery Scheduler         Monday - [place]:    8:00 am-4:45 pm  Tuesday - [place]:   8:00 am-4:45 pm  Friday-[place]:       8:00 am-4:45 pm  Typical Surgery Day:  Wednesday [facility]   [address]   [place] [address] [phone]   Fax [phone]    Imaging Scheduling all locations  [phone]      [facility]   [address] [place] [address] [address].   [place], [place] [address] [phone]    [facility]  [address] [facility] [address]  [phone]  Fax [phone]     Urgent Care locations:  [facility] Monday-Friday                               10 am - 8 pm  Saturday and Sunday                      9 am - 5 pm  Monday-Friday                              10 am- 8 pm  Saturday and Sunday                      9 am - 5 pm    [phone]   **Surgeries** Our Surgery Schedulers will contact you to schedule. If you do not receive a call within 3 business days, please call [phone].  If you need a medication refill, please contact your pharmacy. Please allow 3 business days for your refill to be completed.  As always, Thank you for trusting us with your healthcare needs!  see additional instructions from your care team below

## 2022-06-28 NOTE — PROGRESS NOTES
Jasmyn is a 52 year old  here for annual exam.   Menses are normal Some hot flashes.  Seeing GI for GERD  ROS: Ten point review of systems was reviewed and negative except the above.    Health Maintenance   Topic Date Due     ADVANCE CARE PLANNING  Never done     MAMMO SCREENING  Never done     COVID-19 Vaccine (1) Never done     COLORECTAL CANCER SCREENING  Never done     HIV SCREENING  Never done     HEPATITIS C SCREENING  Never done     PREVENTIVE CARE VISIT  2019     ZOSTER IMMUNIZATION (1 of 2) Never done     DTAP/TDAP/TD IMMUNIZATION (3 - Td or Tdap) 2021     INFLUENZA VACCINE (Season Ended) 2022     HPV TEST  2023     LIPID  2023     PAP  2023     PHQ-2 (once per calendar year)  Completed     Pneumococcal Vaccine: Pediatrics (0 to 5 Years) and At-Risk Patients (6 to 64 Years)  Aged Out     IPV IMMUNIZATION  Aged Out     MENINGITIS IMMUNIZATION  Aged Out     HEPATITIS B IMMUNIZATION  Aged Out      Last pap: 2018  Last Mammogram: none  Last Dexa: none  Last Colonoscopy: none  Lab Results   Component Value Date    CHOL 158 2018     Lab Results   Component Value Date    HDL 84 2018     Lab Results   Component Value Date    LDL 62 2018     Lab Results   Component Value Date    TRIG 61 2018     Lab Results   Component Value Date    CHOLHDLRATIO 2.8 2011         OBHX:      Past Surgical History:   Procedure Laterality Date     CATARACT IOL, RT/LT  2006    right eye     HC CAUTERY OF CERVIX, CRYOCAUTERY      CIN1/HPV     SOFT TISSUE SURGERY  2007       PMH: Her past medical, surgical, and obstetric histories were reviewed and are documented in their appropriate chart areas.    ALL/Meds: Her medication and allergy histories were reviewed and are documented in their appropriate chart areas.    SH/FMH: Her social and family history was reviewed and documented in its appropriate chart area.    PE: /68   Pulse 75   Wt 58 kg (127  lb 14.4 oz)   LMP 06/08/2022 (Exact Date)   SpO2 96%   Breastfeeding No   BMI 20.80 kg/m    Body mass index is 20.8 kg/m .    General Appearance:  healthy, alert, active, no distress  Cardiovascular:  Regular rate and Rhythm  Neck: Supple, no adenopathy and thyroid normal  Lungs:  Clear, without wheeze, rale or rhonchi  Breast: normal breast exam  Abdomen: Benign, Soft, flat, non-tender, No masses, organomegaly, No inguinal nodes and Bowel sounds normoactiveSoft, nontender.   Pelvic:       - Ext: Vulva and perineum are normal without lesion, mass or discharge        - Urethra: normal without discharge or scarring or hypermobility       - Urethral Meatus: normal appearance,        - Bladder: no tenderness, no masses       - Vagina: Normal mucosa, scant blood     rugated       - Cervix: normal       - Uterus:Normal shape, position and consistencyfirm, nontender, nongravid uterus without CMT       - Adnexa: Normal without masses or tenderness       - Rectal: deferred  Nurse for exam.  A/P:  [facility],     ICD-10-CM    1. Encounter for gynecological examination with abnormal finding  Z01.411 CBC with Platelets & Differential     Comprehensive metabolic panel     Lipid panel reflex to direct LDL Fasting     TSH with free T4 reflex     Pap imaged thin layer screen with HPV - recommended age 30 - 65 years (select HPV order below)     Colonscopy Screening  Referral     MA Screen Bilateral w/Kenrick      -  BC: condom  Information provided on shari/menopause.   - Encouraged self-breast exam   - Encouraged low fat diet, regular exercise, and adequate calcium intake.    CEPHAS AGBEH, MD.

## 2022-06-30 ENCOUNTER — TELEPHONE (OUTPATIENT)
Dept: GASTROENTEROLOGY | Facility: CLINIC | Age: 52
End: 2022-06-30

## 2022-06-30 ENCOUNTER — HOSPITAL ENCOUNTER (OUTPATIENT)
Facility: AMBULATORY SURGERY CENTER | Age: 52
End: 2022-06-30
Attending: INTERNAL MEDICINE
Payer: COMMERCIAL

## 2022-06-30 NOTE — TELEPHONE ENCOUNTER
Screening Questions  BlueKIND OF PREP RedLOCATION [review exclusion criteria] GreenSEDATION TYPE      1. Are you active on mychart? yes- please mail letter      2. Ordering/Referring Provider: CEPHAS AGBEH, MD.         3. What insurance is in the chart? [employer]    4. Do you have a legal guardian or medical Power of ?  Are you able to give consent for your medical care? n   (Sedation review/consideration needed)      5.    BMI 21.6 [BMI OVER 40-EXTENDED PREP] 55 130  If greater than 40 review exclusion criteria [PAC APPT IF @ [facility]]       6.  Have you had a positive covid test in the last 90 days? n     7.   Respiratory Screening :  [If yes to any of the following HOSPITAL setting only]                     Do you use daily home oxygen? n  Do you have mod to severe Obstructive Sleep Apnea? n [OKAY @ [facility] SH PH RI]                   Do you have Pulmonary Hypertension? n                   Do you have UNCONTROLLED asthma? n        8.   Have you had a heart or lung transplant? n      9.   Are you currently on dialysis?  n [ If yes, G-PREP & HOSPITAL setting only]      10.   Do you have chronic kidney disease? n [ If yes, G-PREP ]     11.  Have you had a stroke or Transient ischemic attack (TIA - aka  mini stroke ) within 6 months?  n (If yes, please review exclusion criteria)     12.   In the past 6 months, have you had any heart related issues including cardiomyopathy or heart attack? n          If yes, did it require cardiac stenting or other implantable device? n       13.   Do you have any implantable devices in your body (pacemaker, defib, LVAD)? n  (If yes, please review exclusion criteria)     14.   Do you take nitroglycerin? n           If yes, how often? n  (if yes, HOSPITAL setting ONLY)     15.   Are you currently taking any blood thinners? n          [IF YES, INFORM PATIENT TO FOLLOW UP W/ ORDERING PROVIDER FOR BRIDGING INSTRUCTIONS]      16.   Do you have a diagnosis of diabetes? n  [ If yes, G-PREP ]     17.   [FEMALES] Are you currently pregnant? n   If yes, how many weeks? n     18.   Are you taking any prescription pain medications on a routine schedule?  n  [ If yes, EXTENDED PREP.] [If yes, MAC]     19.   Do you have any chemical dependencies such as alcohol, street drugs, or methadone?  n [If yes, MAC]     20.   Do you have any history of post-traumatic stress syndrome, severe anxiety or history of psychosis?  n [If yes, MAC]     21.   Do you transfer independently?  y     22.  On a regular basis do you go 3-5 days between bowel movements? n [ If yes, EXTENDED PREP.]     23.   Preferred LOCAL Pharmacy for Pre Prescription       [id number] [place] [facility] [id number] [place] [address] N          Scheduling Details      Caller :  Jasmyn   (Please ask for phone number if not scheduled by patient)    Type of Procedure Scheduled: Lower Endoscopy [Colonoscopy]  Which Colonoscopy Prep was Sent?: mprep    Surgeon: luana  Date of Procedure: 08/09 655am  Location:     Sedation Type: cs    Conscious Sedation- Needs  for 6 hours after the procedure  MAC/General-Needs  for 24 hours after procedure    Pre-op Required at [facility] and OR for MAC sedation: n  (advise patient they will need a pre-op prior to procedure -)      Informed patient they will need an adult  y  Cannot take any type of public or medical transportation alone    Pre-Procedure Covid test to be completed at [facility] or Externally: home    Confirmed Nurse will call to complete assessment y    Additional comments:      Agbeh, Cephas Mawuena, MD [place], [place]    Next available.   CEPHAS AGBEH, MD.        Previous Messages       ----- Message -----   From: Roselyn Herrera   Sent: 6/30/2022   8:58 AM CDT   To: Cephas Mawuena Agbeh, MD   Subject: RE: Referral                                     Hello,     Is Priority: 1-2 Weeks? Or can it be Next available?       Thank you,     Roselyn  Javier   [facility] Endoscopy Procedure Scheduling Team   [phone] option 2 [procedures]     ----- Message -----   From: Agbeh, Cephas Mawuena, MD   Sent: 6/30/2022   8:51 AM CDT   To: Roselyn Herrera   Subject: RE: Referral                                     The diagnosis is for colonoscopy screening. She is over 50 years old.   CEPHAS AGBEH, MD.     ----- Message -----   From: Roselyn Herrera   Sent: 6/29/2022   3:26 PM CDT   To: Cephas Mawuena Agbeh, MD, *   Subject: Referral                                         [facility] Dr. Agbeh,     We received a referral in the Endoscopy Department and wanted to clarify if a Colonoscopy is needed, If so what would the Diagnosis be?

## 2022-07-05 LAB
BKR LAB AP GYN ADEQUACY: NORMAL
BKR LAB AP GYN INTERPRETATION: NORMAL
BKR LAB AP HPV REFLEX: NORMAL
BKR LAB AP LMP: NORMAL
BKR LAB AP PREVIOUS ABNORMAL: NORMAL
PATH REPORT.COMMENTS IMP SPEC: NORMAL
PATH REPORT.COMMENTS IMP SPEC: NORMAL
PATH REPORT.RELEVANT HX SPEC: NORMAL

## 2022-07-06 LAB
HUMAN PAPILLOMA VIRUS 16 DNA: NEGATIVE
HUMAN PAPILLOMA VIRUS 18 DNA: NEGATIVE
HUMAN PAPILLOMA VIRUS FINAL DIAGNOSIS: ABNORMAL
HUMAN PAPILLOMA VIRUS OTHER HR: POSITIVE

## 2022-07-07 ENCOUNTER — PATIENT OUTREACH (OUTPATIENT)
Dept: OBGYN | Facility: CLINIC | Age: 52
End: 2022-07-07

## 2022-08-04 ENCOUNTER — HOSPITAL ENCOUNTER (OUTPATIENT)
Facility: AMBULATORY SURGERY CENTER | Age: 52
End: 2022-08-04
Attending: SURGERY | Admitting: SURGERY
Payer: COMMERCIAL

## 2022-08-04 ENCOUNTER — TELEPHONE (OUTPATIENT)
Dept: GASTROENTEROLOGY | Facility: CLINIC | Age: 52
End: 2022-08-04

## 2022-08-04 NOTE — TELEPHONE ENCOUNTER
Caller: Jasmyn Muller      Procedure: Colon    Date, Location, and Surgeon of Procedure Cancelled: 8/9, Jerome RANDALL    Ordering Provider:Agbeh, Cephas Mawuena, MD     Reason for cancel (please be detailed, any staff messages or encounters to note?): wanted to change the date        Rescheduled: Y     If rescheduled:    Date: 9/23   Location: [facility]   Prep Resent: Yes (changes to prep?)   Covid Test Rescheduled: no   Note any change or update to original order/sedation: Golytely Prep sent to pt instead of Miralax prep due to magnesium citrate recall.

## 2022-08-10 ENCOUNTER — TELEPHONE (OUTPATIENT)
Dept: FAMILY MEDICINE | Facility: CLINIC | Age: 52
End: 2022-08-10

## 2022-08-10 NOTE — TELEPHONE ENCOUNTER
Needs of attention regarding:  -Breast Cancer Screening    Health Maintenance Topics with due status: Overdue       Topic Date Due    ANNUAL REVIEW OF HM ORDERS Never done    ADVANCE CARE PLANNING Never done    MAMMO SCREENING Never done    COVID-19 Vaccine Never done    COLORECTAL CANCER SCREENING Never done    HIV SCREENING Never done    HEPATITIS C SCREENING Never done    ZOSTER IMMUNIZATION Never done    DTAP/TDAP/TD IMMUNIZATION 11/21/2021       Communication:  See Letter

## 2022-08-10 NOTE — LETTER
26 [address] [facility]          August 10, 2022    Jasmyn C Renzo  [address] [address]    Dear Clifford,    We care about your health and have reviewed your health plan and are making recommendations based on this review, to optimize your health.    You are in particular need of attention regarding:  -Breast Cancer Screening    We are recommending that you:                                                                                                                                        -schedule a MAMMOGRAM which is due.         1 in 8 women will develop invasive breast cancer during her lifetime and it is the most common non-skin cancer in American women.  EARLY detection, new treatments, and a better understanding of the disease have increased survival rates - the 5 year survival rate in the 1960s was 63% and today it is close to 90% .      If you are under/uninsured, we recommend you contact the [facility]. They offer mammograms at no charge or on a sliding fee charge. You can schedule with them at [phone]. Please have them send us the results.          Please disregard this reminder if you have had this exam elsewhere within the last year.  It would be helpful for us to have a copy of your mammogram report in our file so that we can best coordinate your care - please contact us with when your test was done so we can update your record.    In addition, here is a list of due or overdue Health Maintenance reminders.    Health Maintenance Due   Topic Date Due     ANNUAL REVIEW OF HM ORDERS  Never done     Discuss Advance Care Planning  Never done     Mammogram  Never done     COVID-19 Vaccine (1) Never done     Colorectal Cancer Screening  Never done     HIV Screening  Never done     Hepatitis C Screening  Never done     Zoster (Shingles) Vaccine (1 of 2) Never done     Diptheria Tetanus Pertussis (DTAP/TDAP/TD) Vaccine (3 - Td or Tdap) 11/21/2021       To address  the above recommendations, we encourage you to contact us at [phone], via [employer] or by contacting Central Scheduling toll free at [phone] 24 hours a day. They will assist you with finding the most convenient time and location.    Thank you for trusting [facility] and we appreciate the opportunity to serve you.  We look forward to supporting your healthcare needs in the future.    Healthy Regards,    Your [facility] Care[facility]

## 2022-09-15 RX ORDER — BISACODYL 5 MG
TABLET, DELAYED RELEASE (ENTERIC COATED) ORAL
Qty: 4 TABLET | Refills: 0 | Status: SHIPPED | OUTPATIENT
Start: 2022-09-15

## 2022-09-16 RX ORDER — NALOXONE HYDROCHLORIDE 0.4 MG/ML
0.4 INJECTION, SOLUTION INTRAMUSCULAR; INTRAVENOUS; SUBCUTANEOUS
Status: CANCELLED | OUTPATIENT
Start: 2022-09-16

## 2022-09-16 RX ORDER — FLUMAZENIL 0.1 MG/ML
0.2 INJECTION, SOLUTION INTRAVENOUS
Status: CANCELLED | OUTPATIENT
Start: 2022-09-16 | End: 2022-09-17

## 2022-09-16 RX ORDER — NALOXONE HYDROCHLORIDE 0.4 MG/ML
0.2 INJECTION, SOLUTION INTRAMUSCULAR; INTRAVENOUS; SUBCUTANEOUS
Status: CANCELLED | OUTPATIENT
Start: 2022-09-16

## 2022-09-16 RX ORDER — PROCHLORPERAZINE MALEATE 10 MG
10 TABLET ORAL EVERY 6 HOURS PRN
Status: CANCELLED | OUTPATIENT
Start: 2022-09-16

## 2022-09-16 RX ORDER — ONDANSETRON 2 MG/ML
4 INJECTION INTRAMUSCULAR; INTRAVENOUS
Status: CANCELLED | OUTPATIENT
Start: 2022-09-16

## 2022-09-16 RX ORDER — LIDOCAINE 40 MG/G
CREAM TOPICAL
Status: CANCELLED | OUTPATIENT
Start: 2022-09-16

## 2022-09-16 RX ORDER — ONDANSETRON 4 MG/1
4 TABLET, ORALLY DISINTEGRATING ORAL EVERY 6 HOURS PRN
Status: CANCELLED | OUTPATIENT
Start: 2022-09-16

## 2022-09-16 RX ORDER — ONDANSETRON 2 MG/ML
4 INJECTION INTRAMUSCULAR; INTRAVENOUS EVERY 6 HOURS PRN
Status: CANCELLED | OUTPATIENT
Start: 2022-09-16

## 2022-09-22 ENCOUNTER — ANESTHESIA EVENT (OUTPATIENT)
Dept: SURGERY | Facility: AMBULATORY SURGERY CENTER | Age: 52
End: 2022-09-22
Payer: COMMERCIAL

## 2022-09-22 ASSESSMENT — ENCOUNTER SYMPTOMS: DYSRHYTHMIAS: 1

## 2022-09-23 ENCOUNTER — ANESTHESIA (OUTPATIENT)
Dept: SURGERY | Facility: AMBULATORY SURGERY CENTER | Age: 52
End: 2022-09-23
Payer: COMMERCIAL

## 2022-09-23 ENCOUNTER — HOSPITAL ENCOUNTER (OUTPATIENT)
Facility: AMBULATORY SURGERY CENTER | Age: 52
Discharge: HOME | End: 2022-09-23
Attending: INTERNAL MEDICINE
Payer: COMMERCIAL

## 2022-09-23 VITALS
SYSTOLIC BLOOD PRESSURE: 122 MMHG | OXYGEN SATURATION: 98 % | RESPIRATION RATE: 16 BRPM | TEMPERATURE: 99 F | DIASTOLIC BLOOD PRESSURE: 82 MMHG

## 2022-09-23 DIAGNOSIS — Z12.11 COLON CANCER SCREENING: Primary | ICD-10-CM

## 2022-09-23 RX ORDER — SODIUM CHLORIDE, SODIUM LACTATE, POTASSIUM CHLORIDE, CALCIUM CHLORIDE 600; 310; 30; 20 MG/100ML; MG/100ML; MG/100ML; MG/100ML
INJECTION, SOLUTION INTRAVENOUS CONTINUOUS
Status: DISCONTINUED | OUTPATIENT
Start: 2022-09-23 | End: 2022-12-24 | Stop reason: HOSPADM

## 2022-09-23 RX ORDER — LIDOCAINE 40 MG/G
CREAM TOPICAL
Status: DISCONTINUED | OUTPATIENT
Start: 2022-09-23 | End: 2022-12-24 | Stop reason: HOSPADM

## 2022-09-23 NOTE — ANESTHESIA PREPROCEDURE EVALUATION
Anesthesia Pre-Procedure Evaluation    Patient: Jasmyn Muller   MRN: [id number] : 1970        Procedure : Procedure(s):  COLONOSCOPY, WITH CO2 INSUFFLATION          Past Medical History:   Diagnosis Date     Bite, dog     dog attack     Dysplasia of cervix (uteri)     LITA 1/HPV     Irregular heartbeat      Nerve damage     due to dog attack     Scanty or infrequent menstruation     chronic, benign     Usher syndrome     auto recessive; progressive hearing and visual loss      Past Surgical History:   Procedure Laterality Date     CATARACT IOL, RT/LT  2006    right eye     HC CAUTERY OF CERVIX, CRYOCAUTERY      CIN1/HPV     SOFT TISSUE SURGERY  2007      Allergies   Allergen Reactions     Nkda [No Known Drug Allergies]       Social History     Tobacco Use     Smoking status: Never Smoker     Smokeless tobacco: Never Used   Substance Use Topics     Alcohol use: No      Wt Readings from Last 1 Encounters:   22 58 kg (127 lb 14.4 oz)        Anesthesia Evaluation            ROS/MED HX  ENT/Pulmonary:       Neurologic:       Cardiovascular:     (+) -----dysrhythmias, PVCs,     METS/Exercise Tolerance:     Hematologic:       Musculoskeletal:       GI/Hepatic:     (+) bowel prep,     Renal/Genitourinary:       Endo:       Psychiatric/Substance Use:       Infectious Disease:       Malignancy:       Other:               OUTSIDE LABS:  CBC:   Lab Results   Component Value Date    WBC 5.8 2017    WBC 6.8 2011    HGB 13.1 2017    HGB 12.3 2011    HCT 38.3 2017    HCT 36.1 2011     2017     2011     BMP:   Lab Results   Component Value Date     2011    POTASSIUM 3.9 2011    CHLORIDE 103 2011    CO2 26 2011    BUN 15 2011    CR 0.74 2011    GLC 98 2018    GLC 97 2015     COAGS: No results found for: PTT, INR, FIBR  POC: No results found for: BGM, HCG, HCGS  HEPATIC:   Lab  Results   Component Value Date    ALBUMIN 4.5 11/22/2011    PROTTOTAL 7.8 11/22/2011    ALT 18 11/22/2011    AST 23 11/22/2011    ALKPHOS 60 11/22/2011    BILITOTAL 0.6 11/22/2011     OTHER:   Lab Results   Component Value Date    A1C 5.4 11/05/2018    PEREZ 9.0 11/22/2011    TSH 2.12 11/05/2018       Anesthesia Plan    ASA Status:  1   NPO Status:  NPO Appropriate    Anesthesia Type: MAC.     - Reason for MAC: immobility needed   Induction: Intravenous, Propofol.   Maintenance: TIVA.        Consents    Anesthesia Plan(s) and associated risks, benefits, and realistic alternatives discussed. Questions answered and patient/representative(s) expressed understanding.    - Discussed:     - Discussed with:  Patient      - Extended Intubation/Ventilatory Support Discussed: No.      - Patient is DNR/DNI Status: No    Use of blood products discussed: No .     Postoperative Care    Pain management: IV analgesics.   PONV prophylaxis: Background Propofol Infusion     Comments:    Other Comments: Very difficult IV, 2 attempts by nurse with flash but inability to advance. Then 2 attempts one in each arm by anesthesiologist with same result. Attempted with ultrasound and very small vein even in AC. Clifford was very overwhelmed by this and chose not to continue. Offered IV with nitrous sedation and reattempt with ultrasound but patient declined.             Laz Mcdowell MD

## 2022-09-23 NOTE — SIGNIFICANT EVENT
Very difficult IV, 2 attempts by nurse with flash but inability to advance. Then 2 attempts one right arm, one in left hand by anesthesiologist with same result. Attempted with ultrasound and very small vein even in AC. Clifford was very overwhelmed by this and chose not to continue. Offered IV with nitrous sedation and reattempt with ultrasound but Clifford declined.     Laz Mcdowell MD

## 2022-09-23 NOTE — H&P
Brief GI note:  Patient presented today for colonoscopy - routine screening exam - no family history of CRC or colon polyps.  No changes in bowel habits or blood in the stool.  Unfortunately despite multiple attempts at IV placement including with the help of ultrasound and the anesthesiologist one was not able to be obtained so procedure was unable to be done.  Given that patient is average risk for CRC - is reasonable to do non-invasive stool based testing with colo-guard.  Ordered today.

## 2022-09-26 ENCOUNTER — TELEPHONE (OUTPATIENT)
Dept: GASTROENTEROLOGY | Facility: CLINIC | Age: 52
End: 2022-09-26

## 2022-09-26 NOTE — TELEPHONE ENCOUNTER
Noted  of insurance policy patricia.     Cologuard order requisition form completed and faxed to [facility].   Confirmation of successful fax at 3:36PM via Right Fax.     Caitlin Pichardo RN

## 2022-09-26 NOTE — TELEPHONE ENCOUNTER
Left message for patient to return call or send MyChart message with insurance policy patricia .     Caitlin Pichardo RN

## 2022-09-26 NOTE — TELEPHONE ENCOUNTER
[facility] Call Center    Phone Message    May a detailed message be left on voicemail: yes     Reason for Call: Other: Patient called back and the  of spouse/policy patricia is [id number].     Action Taken: Message routed to:  [facility]    Travel Screening: Not Applicable

## 2022-10-05 ENCOUNTER — TELEPHONE (OUTPATIENT)
Dept: GASTROENTEROLOGY | Facility: CLINIC | Age: 52
End: 2022-10-05

## 2022-10-05 NOTE — TELEPHONE ENCOUNTER
[facility] Call Center    Phone Message    May a detailed message be left on voicemail: yes     Reason for Call: Other: [facility] /Cologuard received an order from Dr. Caitlin Duarte on 9/23/22 and Cologuard kit was delivered to patient on 9/27/22; however, they received a duplicate order on 9/27/22 from Dr. Jonas Tapia.  Do they cancel the order from Dr. Tapia?  Please call them and confirm, using case #[id number].     Action Taken: Message routed to: P [facility] Adult [facility]    Travel Screening: Not Applicable

## 2022-10-06 NOTE — TELEPHONE ENCOUNTER
Spoke with [employer] rep- confirmed with rep that the original order for the cologuard was from Dr. Duarte, not Dr. Tapia. Rep stated that the kit was sent out to the patient on 9/27. Will cancel the inadvertent duplicate order and send a fax to our clinic confirming this.     Caitlin Pichardo RN

## 2022-11-11 LAB — NONINV COLON CA DNA+OCC BLD SCRN STL QL: NEGATIVE

## 2022-11-21 ENCOUNTER — HEALTH MAINTENANCE LETTER (OUTPATIENT)
Age: 52
End: 2022-11-21

## 2023-04-16 ENCOUNTER — HEALTH MAINTENANCE LETTER (OUTPATIENT)
Age: 53
End: 2023-04-16

## 2023-06-14 ENCOUNTER — PATIENT OUTREACH (OUTPATIENT)
Dept: OBGYN | Facility: CLINIC | Age: 53
End: 2023-06-14
Payer: COMMERCIAL

## 2023-06-14 DIAGNOSIS — R87.810 CERVICAL HIGH RISK HPV (HUMAN PAPILLOMAVIRUS) TEST POSITIVE: ICD-10-CM

## 2023-08-24 ASSESSMENT — ENCOUNTER SYMPTOMS
DIZZINESS: 0
HEARTBURN: 0
SHORTNESS OF BREATH: 0
FREQUENCY: 0
PALPITATIONS: 0
COUGH: 0
SORE THROAT: 0
HEADACHES: 0
WEAKNESS: 0
DIARRHEA: 0
PARESTHESIAS: 0
ARTHRALGIAS: 1
HEMATOCHEZIA: 0
MYALGIAS: 1
NAUSEA: 0
EYE PAIN: 0
CHILLS: 0
CONSTIPATION: 0
BREAST MASS: 0
ABDOMINAL PAIN: 0
NERVOUS/ANXIOUS: 0
FEVER: 0
HEMATURIA: 0
DYSURIA: 0

## 2023-08-29 ENCOUNTER — OFFICE VISIT (OUTPATIENT)
Dept: OBGYN | Facility: CLINIC | Age: 53
End: 2023-08-29
Payer: COMMERCIAL

## 2023-08-29 VITALS
HEIGHT: 65 IN | WEIGHT: 132.8 LBS | SYSTOLIC BLOOD PRESSURE: 141 MMHG | DIASTOLIC BLOOD PRESSURE: 82 MMHG | BODY MASS INDEX: 22.13 KG/M2

## 2023-08-29 DIAGNOSIS — R21 FACIAL RASH: ICD-10-CM

## 2023-08-29 DIAGNOSIS — Z01.411 ENCOUNTER FOR GYNECOLOGICAL EXAMINATION WITH ABNORMAL FINDING: Primary | ICD-10-CM

## 2023-08-29 LAB
ALBUMIN SERPL BCG-MCNC: 4.9 G/DL (ref 3.5–5.2)
ALP SERPL-CCNC: 56 U/L (ref 35–129)
ALT SERPL W P-5'-P-CCNC: 12 U/L (ref 0–70)
ANION GAP SERPL CALCULATED.3IONS-SCNC: 11 MMOL/L (ref 7–15)
AST SERPL W P-5'-P-CCNC: 19 U/L (ref 0–45)
BASOPHILS # BLD AUTO: 0.1 10E3/UL (ref 0–0.2)
BASOPHILS NFR BLD AUTO: 1 %
BILIRUB SERPL-MCNC: 0.5 MG/DL
BUN SERPL-MCNC: 12.9 MG/DL (ref 6–20)
CALCIUM SERPL-MCNC: 9.4 MG/DL (ref 8.6–10)
CHLORIDE SERPL-SCNC: 104 MMOL/L (ref 98–107)
CHOLEST SERPL-MCNC: 159 MG/DL
CREAT SERPL-MCNC: 0.8 MG/DL (ref 0.51–1.17)
DEPRECATED HCO3 PLAS-SCNC: 26 MMOL/L (ref 22–29)
EOSINOPHIL # BLD AUTO: 0.2 10E3/UL (ref 0–0.7)
EOSINOPHIL NFR BLD AUTO: 3 %
ERYTHROCYTE [DISTWIDTH] IN BLOOD BY AUTOMATED COUNT: 12.2 % (ref 10–15)
FSH SERPL IRP2-ACNC: 25.3 MIU/ML (ref 1.5–134.8)
GFR SERPL CREATININE-BSD FRML MDRD: 88 ML/MIN/1.73M2
GLUCOSE SERPL-MCNC: 99 MG/DL (ref 70–99)
HCT VFR BLD AUTO: 42.9 % (ref 35–53)
HDLC SERPL-MCNC: 81 MG/DL
HGB BLD-MCNC: 14.4 G/DL (ref 11.7–17.7)
IMM GRANULOCYTES # BLD: 0 10E3/UL
IMM GRANULOCYTES NFR BLD: 0 %
LDLC SERPL CALC-MCNC: 63 MG/DL
LYMPHOCYTES # BLD AUTO: 1.6 10E3/UL (ref 0.8–5.3)
LYMPHOCYTES NFR BLD AUTO: 26 %
MCH RBC QN AUTO: 31 PG (ref 26.5–33)
MCHC RBC AUTO-ENTMCNC: 33.6 G/DL (ref 31.5–36.5)
MCV RBC AUTO: 93 FL (ref 78–100)
MONOCYTES # BLD AUTO: 0.7 10E3/UL (ref 0–1.3)
MONOCYTES NFR BLD AUTO: 11 %
NEUTROPHILS # BLD AUTO: 3.7 10E3/UL (ref 1.6–8.3)
NEUTROPHILS NFR BLD AUTO: 59 %
NONHDLC SERPL-MCNC: 78 MG/DL
NRBC # BLD AUTO: 0 10E3/UL
NRBC BLD AUTO-RTO: 0 /100
PLATELET # BLD AUTO: 228 10E3/UL (ref 150–450)
POTASSIUM SERPL-SCNC: 4.4 MMOL/L (ref 3.4–5.3)
PROT SERPL-MCNC: 7.6 G/DL (ref 6.4–8.3)
RBC # BLD AUTO: 4.64 10E6/UL (ref 3.8–5.9)
SHBG SERPL-SCNC: 98 NMOL/L (ref 11–135)
SODIUM SERPL-SCNC: 141 MMOL/L (ref 136–145)
TRIGL SERPL-MCNC: 75 MG/DL
TSH SERPL DL<=0.005 MIU/L-ACNC: 2.58 UIU/ML (ref 0.3–4.2)
WBC # BLD AUTO: 6.2 10E3/UL (ref 4–11)

## 2023-08-29 PROCEDURE — G0145 SCR C/V CYTO,THINLAYER,RESCR: HCPCS | Performed by: OBSTETRICS & GYNECOLOGY

## 2023-08-29 PROCEDURE — 99396 PREV VISIT EST AGE 40-64: CPT | Performed by: OBSTETRICS & GYNECOLOGY

## 2023-08-29 PROCEDURE — 85025 COMPLETE CBC W/AUTO DIFF WBC: CPT | Performed by: OBSTETRICS & GYNECOLOGY

## 2023-08-29 PROCEDURE — 36415 COLL VENOUS BLD VENIPUNCTURE: CPT | Performed by: OBSTETRICS & GYNECOLOGY

## 2023-08-29 PROCEDURE — 84270 ASSAY OF SEX HORMONE GLOBUL: CPT | Performed by: OBSTETRICS & GYNECOLOGY

## 2023-08-29 PROCEDURE — 84443 ASSAY THYROID STIM HORMONE: CPT | Performed by: OBSTETRICS & GYNECOLOGY

## 2023-08-29 PROCEDURE — 80061 LIPID PANEL: CPT | Performed by: OBSTETRICS & GYNECOLOGY

## 2023-08-29 PROCEDURE — 80053 COMPREHEN METABOLIC PANEL: CPT | Performed by: OBSTETRICS & GYNECOLOGY

## 2023-08-29 PROCEDURE — 87624 HPV HI-RISK TYP POOLED RSLT: CPT | Performed by: OBSTETRICS & GYNECOLOGY

## 2023-08-29 PROCEDURE — 83001 ASSAY OF GONADOTROPIN (FSH): CPT | Performed by: OBSTETRICS & GYNECOLOGY

## 2023-08-29 PROCEDURE — 84403 ASSAY OF TOTAL TESTOSTERONE: CPT | Performed by: OBSTETRICS & GYNECOLOGY

## 2023-08-29 RX ORDER — VALACYCLOVIR HYDROCHLORIDE 500 MG/1
TABLET, FILM COATED ORAL
COMMUNITY
Start: 2023-05-26

## 2023-08-29 RX ORDER — ACYCLOVIR 400 MG/1
TABLET ORAL
COMMUNITY
Start: 2022-10-07

## 2023-08-29 RX ORDER — TRETINOIN 0.25 MG/G
CREAM TOPICAL
COMMUNITY
Start: 2023-04-06

## 2023-08-29 NOTE — PROGRESS NOTES
Jasmyn is a 53 year old  here for annual exam.   Menses were normal till last cycle. Late.  Complaints of facial rash x 2-3 weeks..    ROS: Ten point review of systems was reviewed and negative except the above.    Health Maintenance   Topic Date Due    ADVANCE CARE PLANNING  Never done    MAMMO SCREENING  Never done    COVID-19 Vaccine (1) Never done    HIV SCREENING  Never done    HEPATITIS C SCREENING  Never done    HEPATITIS B IMMUNIZATION (2 of 3 - 19+ 3-dose series) 2017    HEPATITIS A IMMUNIZATION (2 of 2 - Risk 2-dose series) 2017    ZOSTER IMMUNIZATION (1 of 2) Never done    YEARLY PREVENTIVE VISIT  2023    PAP FOLLOW-UP  2023    HPV FOLLOW-UP  2023    INFLUENZA VACCINE (1) 2023    LIPID  2023    COLORECTAL CANCER SCREENING  2025    DTAP/TDAP/TD IMMUNIZATION (4 - Td or Tdap) 2032    PHQ-2 (once per calendar year)  Completed    Pneumococcal Vaccine: Pediatrics (0 to 5 Years) and At-Risk Patients (6 to 64 Years)  Aged Out    IPV IMMUNIZATION  Aged Out    MENINGITIS IMMUNIZATION  Aged Out    PAP  Discontinued      Last pap:   Last Mammogram: not done  Last Dexa: none  Last Colonoscopy:  COLOGAURD  Lab Results   Component Value Date    CHOL 158 2018     Lab Results   Component Value Date    HDL 84 2018     Lab Results   Component Value Date    LDL 62 2018     Lab Results   Component Value Date    TRIG 61 2018     Lab Results   Component Value Date    CHOLHDLRATIO 2.8 2011         OBHX:    OB History    Para Term  AB Living   2 1 0 1 1 1   SAB IAB Ectopic Multiple Live Births   0 1 0 0 1      # Outcome Date GA Lbr Gal/2nd Weight Sex Delivery Anes PTL Lv   2   34w0d  2.41 kg (5 lb 5 oz) F   Y CLARISSE      Name: Roya   1 IAB                Gyn surg.:  Past Surgical History:   Procedure Laterality Date    CATARACT IOL, RT/LT  2006    right eye    HC CAUTERY OF CERVIX, CRYOCAUTERY   "1997    CIN1/HPV    SOFT TISSUE SURGERY  11/2007      PMH: Her past medical, surgical, and obstetric histories were reviewed and are documented in their appropriate chart areas.    ALL/Meds: Her medication and allergy histories were reviewed and are documented in their appropriate chart areas.    SH/FMH: Her social and family history was reviewed and documented in its appropriate chart area.    PE: BP (!) 141/82   Ht 1.651 m (5' 5\")   Wt 60.2 kg (132 lb 12.8 oz)   LMP 08/16/2023   BMI 22.10 kg/m    Body mass index is 22.1 kg/m .    General Appearance:  healthy, alert, active, no distress  Cardiovascular:  Regular rate and Rhythm  Neck: Supple, no adenopathy, and thyroid normal  Lungs:  Clear, without wheeze, rale or rhonchi  Breast: normal breast exam  Abdomen: Benign, Soft, flat, non-tender, No masses, organomegaly, No inguinal nodes, and Bowel sounds normoactiveSoft, nontender.   Pelvic:       - Ext: Vulva and perineum are normal without lesion, mass or discharge        - Urethra: normal without discharge or scarring or hypermobility       - Urethral Meatus: normal appearance,        - Bladder: no tenderness, no masses       - Vagina: Normal mucosa, no discharge     rugated       - Cervix: normal       - Uterus:Normal shape, position and consistencyfirm, nontender, nongravid uterus without CMT       - Adnexa: Normal without masses or tenderness       - Rectal: deferred  Nurse for exam  A/P:  [facility],     ICD-10-CM    1. Encounter for gynecological examination with abnormal finding  Z01.411 CBC with Platelets & Differential     Comprehensive metabolic panel     Lipid panel reflex to direct LDL Fasting     TSH with free T4 reflex     Pap imaged thin layer screen with HPV - recommended age 30 - 65 years (select HPV order below)     Follicle stimulating hormone     Testosterone Free and Total     MA Screen Bilateral w/Kenrick     Adult Dermatology Referral     CBC with Platelets & Differential     Comprehensive " metabolic panel     Lipid panel reflex to direct LDL Fasting     TSH with free T4 reflex     Follicle stimulating hormone     Testosterone Free and Total      2. Facial rash  R21 Adult Dermatology Referral             - Encouraged self-breast exam   - Encouraged low fat diet, regular exercise, and adequate calcium intake.    CEPHAS AGBEH, MD.      Answers submitted by the patient for this visit:  Annual Preventive Visit (Submitted on 8/24/2023)  Chief Complaint: Annual Exam:  Frequency of exercise:: 2-3 days/week  Getting at least 3 servings of Calcium per day:: Yes  Diet:: Regular (no restrictions)  Taking medications regularly:: Yes  Medication side effects:: None  Bi-annual eye exam:: Yes  Dental care twice a year:: Yes  Sleep apnea or symptoms of sleep apnea:: None  abdominal pain: No  Blood in stool: No  Blood in urine: No  chest pain: No  chills: No  congestion: No  constipation: No  cough: No  diarrhea: No  dizziness: No  ear pain: No  eye pain: No  nervous/anxious: No  fever: No  frequency: No  genital sores: No  headaches: No  hearing loss: No  heartburn: No  arthralgias: Yes  peripheral edema: No  mood changes: No  myalgias: Yes  nausea: No  dysuria: No  palpitations: No  Skin sensation changes: No  sore throat: No  urgency: No  rash: Yes  shortness of breath: No  visual disturbance: Yes  weakness: No  pelvic pain: No  vaginal bleeding: No  vaginal discharge: No  tenderness: No  breast mass: No  breast discharge: No  impotence: No  penile discharge: No  Additional concerns today:: Yes  Exercise outside of work (Submitted on 8/24/2023)  Chief Complaint: Annual Exam:  Duration of exercise:: 15-30 minutes

## 2023-08-31 LAB
TESTOST FREE SERPL-MCNC: 0.26 NG/DL
TESTOST SERPL-MCNC: 31 NG/DL (ref 8–950)

## 2023-09-01 LAB
BKR LAB AP GYN ADEQUACY: NORMAL
BKR LAB AP GYN INTERPRETATION: NORMAL
BKR LAB AP HPV REFLEX: NORMAL
BKR LAB AP LMP: NORMAL
BKR LAB AP PREVIOUS ABNL DX: NORMAL
BKR LAB AP PREVIOUS ABNORMAL: NORMAL
PATH REPORT.COMMENTS IMP SPEC: NORMAL
PATH REPORT.COMMENTS IMP SPEC: NORMAL
PATH REPORT.RELEVANT HX SPEC: NORMAL

## 2023-09-06 ENCOUNTER — PATIENT OUTREACH (OUTPATIENT)
Dept: OBGYN | Facility: CLINIC | Age: 53
End: 2023-09-06
Payer: COMMERCIAL

## 2023-09-06 LAB
HUMAN PAPILLOMA VIRUS 16 DNA: NEGATIVE
HUMAN PAPILLOMA VIRUS 18 DNA: NEGATIVE
HUMAN PAPILLOMA VIRUS FINAL DIAGNOSIS: NORMAL
HUMAN PAPILLOMA VIRUS OTHER HR: NEGATIVE

## 2023-11-28 ENCOUNTER — ANCILLARY PROCEDURE (OUTPATIENT)
Dept: MAMMOGRAPHY | Facility: CLINIC | Age: 53
End: 2023-11-28
Attending: OBSTETRICS & GYNECOLOGY
Payer: COMMERCIAL

## 2023-11-28 DIAGNOSIS — Z01.411 ENCOUNTER FOR GYNECOLOGICAL EXAMINATION WITH ABNORMAL FINDING: ICD-10-CM

## 2023-11-28 DIAGNOSIS — Z12.31 VISIT FOR SCREENING MAMMOGRAM: ICD-10-CM

## 2023-11-28 PROCEDURE — 77067 SCR MAMMO BI INCL CAD: CPT | Performed by: STUDENT IN AN ORGANIZED HEALTH CARE EDUCATION/TRAINING PROGRAM

## 2023-11-28 PROCEDURE — 77063 BREAST TOMOSYNTHESIS BI: CPT | Performed by: STUDENT IN AN ORGANIZED HEALTH CARE EDUCATION/TRAINING PROGRAM

## 2023-12-27 ENCOUNTER — ANCILLARY PROCEDURE (OUTPATIENT)
Dept: MAMMOGRAPHY | Facility: CLINIC | Age: 53
End: 2023-12-27
Attending: OBSTETRICS & GYNECOLOGY
Payer: COMMERCIAL

## 2023-12-27 ENCOUNTER — ANCILLARY PROCEDURE (OUTPATIENT)
Dept: ULTRASOUND IMAGING | Facility: CLINIC | Age: 53
End: 2023-12-27
Attending: OBSTETRICS & GYNECOLOGY
Payer: COMMERCIAL

## 2023-12-27 DIAGNOSIS — R92.8 ABNORMAL MAMMOGRAM: ICD-10-CM

## 2023-12-27 PROCEDURE — G0279 TOMOSYNTHESIS, MAMMO: HCPCS

## 2023-12-27 PROCEDURE — 77065 DX MAMMO INCL CAD UNI: CPT | Mod: LT

## 2023-12-27 PROCEDURE — 76642 ULTRASOUND BREAST LIMITED: CPT | Mod: LT

## 2024-01-25 ENCOUNTER — TRANSFERRED RECORDS (OUTPATIENT)
Dept: HEALTH INFORMATION MANAGEMENT | Facility: CLINIC | Age: 54
End: 2024-01-25
Payer: COMMERCIAL

## 2024-08-08 ENCOUNTER — PATIENT OUTREACH (OUTPATIENT)
Dept: OBGYN | Facility: CLINIC | Age: 54
End: 2024-08-08
Payer: COMMERCIAL

## 2024-08-12 ENCOUNTER — ANCILLARY PROCEDURE (OUTPATIENT)
Dept: MAMMOGRAPHY | Facility: CLINIC | Age: 54
End: 2024-08-12
Attending: OBSTETRICS & GYNECOLOGY
Payer: COMMERCIAL

## 2024-08-12 DIAGNOSIS — R92.8 BI-RADS CATEGORY 3 MAMMOGRAM RESULT: ICD-10-CM

## 2024-08-12 PROCEDURE — 77065 DX MAMMO INCL CAD UNI: CPT | Mod: LT

## 2024-08-13 DIAGNOSIS — R92.8 BI-RADS CATEGORY 3 MAMMOGRAM RESULT: Primary | ICD-10-CM

## 2024-10-07 NOTE — TELEPHONE ENCOUNTER
FYI to provider - Patient is lost to pap tracking follow-up. Attempts to contact pt have been made per reminder process and there has been no reply and/or no appt scheduled. Contact hx listed below.     11/5/18 NIL Pap, Neg HR HPV. Plan: cotest in 5 years.   6/28/22 NIL pap, + HR HPV (not 16 or 18). Plan: cotest in 1 yr.   8/29/23 NIL Pap, Neg HR HPV Plan cotest in 1 year due 08/29/24 8/8/24 Reminder mychart  9/12/24 Reminder call-lm  10/7/24 Lost to follow-up for pap tracking     Asha Montaño RN BSN, Pap Tracking

## 2024-11-09 ENCOUNTER — HEALTH MAINTENANCE LETTER (OUTPATIENT)
Age: 54
End: 2024-11-09

## (undated) DEVICE — PREP CHLORAPREP 26ML TINTED ORANGE  260815

## (undated) DEVICE — KIT ENDO FIRST STEP DISINFECTANT 200ML W/POUCH EP-4

## (undated) DEVICE — PAD CHUX UNDERPAD 23X24" 7136

## (undated) RX ORDER — LIDOCAINE HYDROCHLORIDE 10 MG/ML
INJECTION, SOLUTION EPIDURAL; INFILTRATION; INTRACAUDAL; PERINEURAL
Status: DISPENSED
Start: 2022-09-23